# Patient Record
Sex: FEMALE | Race: AMERICAN INDIAN OR ALASKA NATIVE
[De-identification: names, ages, dates, MRNs, and addresses within clinical notes are randomized per-mention and may not be internally consistent; named-entity substitution may affect disease eponyms.]

---

## 2018-02-10 ENCOUNTER — HOSPITAL ENCOUNTER (EMERGENCY)
Dept: HOSPITAL 5 - ED | Age: 23
Discharge: HOME | End: 2018-02-10
Payer: MEDICAID

## 2018-02-10 VITALS — DIASTOLIC BLOOD PRESSURE: 55 MMHG | SYSTOLIC BLOOD PRESSURE: 111 MMHG

## 2018-02-10 DIAGNOSIS — O26.891: Primary | ICD-10-CM

## 2018-02-10 DIAGNOSIS — Z3A.15: ICD-10-CM

## 2018-02-10 DIAGNOSIS — R10.33: ICD-10-CM

## 2018-02-10 LAB
ALBUMIN SERPL-MCNC: 4 G/DL (ref 3.9–5)
ALT SERPL-CCNC: 8 UNITS/L (ref 7–56)
BASOPHILS # (AUTO): 0 K/MM3 (ref 0–0.1)
BASOPHILS NFR BLD AUTO: 0.6 % (ref 0–1.8)
BUN SERPL-MCNC: 7 MG/DL (ref 7–17)
BUN/CREAT SERPL: 18 %
CALCIUM SERPL-MCNC: 9 MG/DL (ref 8.4–10.2)
EOSINOPHIL # BLD AUTO: 0 K/MM3 (ref 0–0.4)
EOSINOPHIL NFR BLD AUTO: 0.6 % (ref 0–4.3)
HCT VFR BLD CALC: 35.9 % (ref 30.3–42.9)
HEMOLYSIS INDEX: 3
HGB BLD-MCNC: 12.2 GM/DL (ref 10.1–14.3)
LYMPHOCYTES # BLD AUTO: 1 K/MM3 (ref 1.2–5.4)
LYMPHOCYTES NFR BLD AUTO: 13.5 % (ref 13.4–35)
MCH RBC QN AUTO: 31 PG (ref 28–32)
MCHC RBC AUTO-ENTMCNC: 34 % (ref 30–34)
MCV RBC AUTO: 89 FL (ref 79–97)
MONOCYTES # (AUTO): 0.4 K/MM3 (ref 0–0.8)
MONOCYTES % (AUTO): 5.7 % (ref 0–7.3)
PLATELET # BLD: 155 K/MM3 (ref 140–440)
RBC # BLD AUTO: 4.02 M/MM3 (ref 3.65–5.03)

## 2018-02-10 PROCEDURE — 86901 BLOOD TYPING SEROLOGIC RH(D): CPT

## 2018-02-10 PROCEDURE — 84702 CHORIONIC GONADOTROPIN TEST: CPT

## 2018-02-10 PROCEDURE — 80053 COMPREHEN METABOLIC PANEL: CPT

## 2018-02-10 PROCEDURE — 85025 COMPLETE CBC W/AUTO DIFF WBC: CPT

## 2018-02-10 PROCEDURE — 76805 OB US >/= 14 WKS SNGL FETUS: CPT

## 2018-02-10 PROCEDURE — 86850 RBC ANTIBODY SCREEN: CPT

## 2018-02-10 PROCEDURE — 84703 CHORIONIC GONADOTROPIN ASSAY: CPT

## 2018-02-10 PROCEDURE — 36415 COLL VENOUS BLD VENIPUNCTURE: CPT

## 2018-02-10 PROCEDURE — 86900 BLOOD TYPING SEROLOGIC ABO: CPT

## 2018-02-10 NOTE — EMERGENCY DEPARTMENT REPORT
Blank Doc





- Documentation


Documentation: 





Patient is a 22-year-old female  at 15 weeks and presents with abdominal 

pain she denies any vaginal bleeding or vaginal discharge will urinalysis blood 

work and OB ultrasound.

## 2018-02-10 NOTE — ULTRASOUND REPORT
FINAL REPORT



EXAM:  US OB &gt; = 14 WEEKS FETUS



HISTORY:  abd pain 



TECHNIQUE:   Ultrasound evaluation of the gravid uterus 



PRIORS:  Pelvic ultrasound 5/7/2016



FINDINGS:  

There is a single viable intrauterine pregnancy with documented

cardiac activity.  Multiple ultrasound measurements are made to

determine a composite gestational age. 



Nonspecific slight abnormality of fetal ratio. Cephalic index

slightly increased at 86.4, upper normal 83.0. Other fetal ratios

are within normal limits. 



There is no evidence of placenta previa or abruption.  The

maternal cervix is closed.  The quantity of visualized amniotic

fluid appears grossly normal.  No sonographic abnormality in the

visualized portion of the fetal anatomy.



Heart rate:  152 beats per minute



Fetal position:  Cephalic



Placental position:  Anterior grade 0 



Maternal cervix length:  3.5cm



Ultrasound estimated gestational age: 15 weeks 4 days



Ultrasound estimated delivery date: 7/31/2018



LMP estimated gestational age: 15 weeks 1 day



LMP estimated delivery date: 8/3/2018



22 mm simple appearing cyst in left ovary may be a corpus luteum.





IMPRESSION:  

Single viable intrauterine pregnancy with the above parameters



Nonspecific slightly increased cephalic index

## 2018-02-10 NOTE — EMERGENCY DEPARTMENT REPORT
ED Abdominal Pain HPI





- General


Chief Complaint: Abdominal Pain


Stated Complaint: ABDOMINAL ABSCESS


Time Seen by Provider: 02/10/18 16:04


Source: patient


Mode of arrival: Ambulatory


Limitations: No Limitations





- History of Present Illness


-: Gradual


Location: periumbilical





- Related Data


 Previous Rx's











 Medication  Instructions  Recorded  Last Taken  Type


 


Doxycycline [Vibramycin] 100 mg PO Q12HR #28 capsule 05/07/16 Unknown Rx


 


Ketorolac [Toradol] 10 mg PO Q6H PRN #20 tablet 05/07/16 Unknown Rx


 


Ondansetron [Zofran Odt] 4 mg PO QID PRN #20 tab.rapdis 05/07/16 Unknown Rx


 


metroNIDAZOLE [Flagyl] 500 mg PO Q12HR #14 tab 05/07/16 Unknown Rx











 Allergies











Allergy/AdvReac Type Severity Reaction Status Date / Time


 


No Known Allergies Allergy   Verified 02/10/18 12:52














ED Review of Systems


ROS: 


Stated complaint: ABDOMINAL ABSCESS


Other details as noted in HPI





Comment: All other systems reviewed and negative


Skin: other (PERIUMBIILICAL PAIN)





ED Past Medical Hx





- Past Medical History


Previous Medical History?: No





- Surgical History


Past Surgical History?: No





- Social History


Smoking Status: Never Smoker


Substance Use Type: None





- Medications


Home Medications: 


 Home Medications











 Medication  Instructions  Recorded  Confirmed  Last Taken  Type


 


Doxycycline [Vibramycin] 100 mg PO Q12HR #28 capsule 05/07/16  Unknown Rx


 


Ketorolac [Toradol] 10 mg PO Q6H PRN #20 tablet 05/07/16  Unknown Rx


 


Ondansetron [Zofran Odt] 4 mg PO QID PRN #20 tab.rapdis 05/07/16  Unknown Rx


 


metroNIDAZOLE [Flagyl] 500 mg PO Q12HR #14 tab 05/07/16  Unknown Rx














ED Physical Exam





- General


Limitations: No Limitations


General appearance: alert





- Head


Head exam: Present: atraumatic





- Eye


Eye exam: Present: normal appearance





- ENT


ENT exam: Present: normal exam, mucous membranes moist





- Neck


Neck exam: Present: normal inspection





- Respiratory


Respiratory exam: Present: normal lung sounds bilaterally





- Cardiovascular


Cardiovascular Exam: Present: regular rate





- GI/Abdominal


GI/Abdominal exam: Present: soft, normal bowel sounds, other (GRAVID).  Absent: 

distended, tenderness, guarding, rebound, rigid, diminished bowel sounds, 

hyperactive bowel sounds, hypoactive bowel sounds, organomegaly, mass, bruit, 

pulsatile mass, hernia





- Extremities Exam


Extremities exam: Present: normal inspection





- Back Exam


Back exam: Present: normal inspection





- Neurological Exam


Neurological exam: Present: alert, oriented X3, CN II-XII intact





- Psychiatric


Psychiatric exam: Present: normal affect, normal mood





- Skin


Skin exam: Present: warm, dry, intact, normal color





ED Course


 Vital Signs











  02/10/18





  12:52


 


Pulse Rate 100 H


 


Respiratory 16





Rate 


 


Blood Pressure 124/59


 


O2 Sat by Pulse 99





Oximetry 














- Reevaluation(s)


Reevaluation #1: 





02/10/18 16:15


Patient presents to the ER today with a very dramatic presentation of 

bellybutton peristasis she states it is so bad that she cannot sleep at night.  

Patient is 15 weeks pregnant.  No vaginal bleeding or discharge.  Patient has 

seen an OB/GYN.  She is on prenatal vitamins.





Lab work was noted beta-hCG noted.  UA noted.





Ultrasound noted fetal heart tones and activity present.





This is patient's first pregnancy pregnancy she is on small petite side 

baseline.


Discussion about expectations of PREG





Patient educated on pain management.  She will follow up with OB next week.





ED Medical Decision Making





- Lab Data


Result diagrams: 


 02/10/18 13:05





 02/10/18 13:05





- Radiology Data


Radiology results: report reviewed





- Medical Decision Making





SEE NOTE





- Differential Diagnosis


RO SPONT AB


Critical care attestation.: 


If time is entered above; I have spent that time in minutes in the direct care 

of this critically ill patient, excluding procedure time.








ED Disposition


Clinical Impression: 


 Pregnancy, Umbilical pain





Disposition: DC-01 TO HOME OR SELFCARE


Is pt being admited?: No


Does the pt Need Aspirin: No


Condition: Stable


Instructions:  Pregnancy (ED)


Additional Instructions: 


FOLLOW UP WITH OBGYN THIS WEEK





TAKE TYLENOL FOR PAIN





CONTINUE YOUR PRENATAL VITAMINS





MAKE SURE YOU TELL OBGYN YOU WERE HERE SO THAT THEY CAN GET YOUR RESULTS. 





Referrals: 


PRIMARY CARE,MD [Primary Care Provider] - 3-5 Days


Time of Disposition: 16:10

## 2018-08-13 ENCOUNTER — HOSPITAL ENCOUNTER (INPATIENT)
Dept: HOSPITAL 5 - LD | Age: 23
LOS: 5 days | Discharge: HOME | End: 2018-08-18
Attending: OBSTETRICS & GYNECOLOGY | Admitting: OBSTETRICS & GYNECOLOGY
Payer: COMMERCIAL

## 2018-08-13 DIAGNOSIS — Z79.899: ICD-10-CM

## 2018-08-13 DIAGNOSIS — O48.0: Primary | ICD-10-CM

## 2018-08-13 DIAGNOSIS — Z83.2: ICD-10-CM

## 2018-08-13 DIAGNOSIS — Z84.89: ICD-10-CM

## 2018-08-13 DIAGNOSIS — Z3A.41: ICD-10-CM

## 2018-08-13 DIAGNOSIS — F10.10: ICD-10-CM

## 2018-08-13 DIAGNOSIS — D64.9: ICD-10-CM

## 2018-08-13 DIAGNOSIS — B00.9: ICD-10-CM

## 2018-08-13 DIAGNOSIS — Z79.2: ICD-10-CM

## 2018-08-13 LAB
HCT VFR BLD CALC: 35.9 % (ref 30.3–42.9)
HGB BLD-MCNC: 12.2 GM/DL (ref 10.1–14.3)
MCH RBC QN AUTO: 31 PG (ref 28–32)
MCHC RBC AUTO-ENTMCNC: 34 % (ref 30–34)
MCV RBC AUTO: 91 FL (ref 79–97)
PLATELET # BLD: 150 K/MM3 (ref 140–440)
RBC # BLD AUTO: 3.97 M/MM3 (ref 3.65–5.03)

## 2018-08-13 PROCEDURE — 36415 COLL VENOUS BLD VENIPUNCTURE: CPT

## 2018-08-13 PROCEDURE — G0463 HOSPITAL OUTPT CLINIC VISIT: HCPCS

## 2018-08-13 PROCEDURE — 86850 RBC ANTIBODY SCREEN: CPT

## 2018-08-13 PROCEDURE — A6250 SKIN SEAL PROTECT MOISTURIZR: HCPCS

## 2018-08-13 PROCEDURE — 85014 HEMATOCRIT: CPT

## 2018-08-13 PROCEDURE — 85018 HEMOGLOBIN: CPT

## 2018-08-13 PROCEDURE — 99211 OFF/OP EST MAY X REQ PHY/QHP: CPT

## 2018-08-13 PROCEDURE — 86900 BLOOD TYPING SEROLOGIC ABO: CPT

## 2018-08-13 PROCEDURE — 85027 COMPLETE CBC AUTOMATED: CPT

## 2018-08-13 PROCEDURE — 3E0P7VZ INTRODUCTION OF HORMONE INTO FEMALE REPRODUCTIVE, VIA NATURAL OR ARTIFICIAL OPENING: ICD-10-PCS

## 2018-08-13 PROCEDURE — 86592 SYPHILIS TEST NON-TREP QUAL: CPT

## 2018-08-13 PROCEDURE — 59200 INSERT CERVICAL DILATOR: CPT

## 2018-08-13 PROCEDURE — 86901 BLOOD TYPING SEROLOGIC RH(D): CPT

## 2018-08-13 NOTE — HISTORY AND PHYSICAL REPORT
History of Present Illness


Date of examination: 18


Date of admission: 


18 20:29





Chief complaint: 





IOL for post dates 


History of present illness: 


This is a a22 yo  at 41 + weeks admitted to labor and delivery for IOL. 

She is a patient of [facility]. Her problem list include UTi treated with abx and 

horace neg. 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 

on perineum. She has anemia on iron once a day. 








Past History


Past Medical History: no pertinent history


Past Surgical History: no surgical history


GYN History: herpes


Family/Genetic History: sickle cell/trait (anemia ), other (pulmonary embolism )


Social history: smoking, alcohol abuse.  denies: prescription drug abuse





- Obstetrical History


Expected Date of Delivery: 18


Actual Gestation: 41 Week(s) 3 Day(s) 


: 2


Para: 0


Hx # Term Pregnancies: 0


Number of  Pregnancies: 0


Spontaneous Abortions: 0


Induced : 1


Number of Living Children: 0





Medications and Allergies


 Allergies











Allergy/AdvReac Type Severity Reaction Status Date / Time


 


No Known Allergies Allergy   Verified 02/10/18 12:52











 Home Medications











 Medication  Instructions  Recorded  Confirmed  Last Taken  Type


 


Doxycycline [Vibramycin] 100 mg PO Q12HR #28 capsule 16  Unknown Rx


 


Ketorolac [Toradol] 10 mg PO Q6H PRN #20 tablet 16  Unknown Rx


 


Ondansetron [Zofran Odt] 4 mg PO QID PRN #20 tab.rapdis 16  Unknown Rx


 


metroNIDAZOLE [Flagyl] 500 mg PO Q12HR #14 tab 16  Unknown Rx











Active Meds: 


Active Medications





Butorphanol Tartrate (Stadol)  2 mg IV Q2H PRN


   PRN Reason: Pain , Severe (7-10)


Fentanyl (Sublimaze)  100 mcg IV Q2H PRN


   PRN Reason: Labor Pain


Lactated Ringer's (Lactated Ringers)  1,000 mls @ 125 mls/hr IV AS DIRECT DM











Review of Systems


All systems: negative





- Vital Signs


Vital signs: 


 Vital Signs











Pulse Pulse Ox


 


 79   99 


 


 18 21:00  18 21:00








 











Temp Pulse Resp BP Pulse Ox


 


 98.1 F   64   18   135/65   99 


 


 18 21:14  18 22:46  18 21:14  18 21:22  18 22:46














- Physical Exam


Breasts: Positive: normal


Cardiovascular: Regular rate, Normal S1


Lungs: Positive: Clear to auscultation, Normal air movement


Abdomen: Positive: normal appearance, soft, normal bowel sounds.  Negative: 

distention, tenderness, guarding


Genitourinary (Female): Positive: normal external genitalia, normal perenium


Vulva: both: normal


Vagina: Positive: normal moisture


Uterus: Positive: normal size


Anus/Rectum: Positive: normal perianal skin


Extremities: Positive: normal


Deep Tendon Reflex Grade: Normal +2





- Obstetrical


FHR: category 1


Uterine Contraction Monitor Mode: Palpation


Cervical Dilatation: 0


Cervical Effacement Percentage: 40


Fetal station: -3


Uterine Tone Measurement Phase: Resting





Results


Result Diagrams: 


 18 21:28





All other labs normal.








Assessment and Plan


A/P HD#1 IOL for postdates 


       GBS neg


       induction with cervidil 


       offer epidural 


       close monitor of maternal and fetus status 


       epect vaginal delivery

## 2018-08-14 RX ADMIN — SODIUM CHLORIDE, SODIUM LACTATE, POTASSIUM CHLORIDE, AND CALCIUM CHLORIDE SCH MLS/HR: .6; .31; .03; .02 INJECTION, SOLUTION INTRAVENOUS at 02:49

## 2018-08-14 RX ADMIN — SODIUM CHLORIDE, SODIUM LACTATE, POTASSIUM CHLORIDE, AND CALCIUM CHLORIDE SCH MLS/HR: .6; .31; .03; .02 INJECTION, SOLUTION INTRAVENOUS at 00:36

## 2018-08-14 RX ADMIN — MISOPROSTOL PRN MCG: 100 TABLET ORAL at 15:33

## 2018-08-14 RX ADMIN — MISOPROSTOL PRN MCG: 100 TABLET ORAL at 21:17

## 2018-08-14 NOTE — EVENT NOTE
Date: 08/14/18





Cervidil placed at 2339


reactive and Category 1 tracing


irreg contractions


P: D/C Cervidil as ordered


Evaluate for cervical change and proceed with induction for postdates

## 2018-08-15 RX ADMIN — OXYCODONE AND ACETAMINOPHEN PRN TAB: 5; 325 TABLET ORAL at 21:30

## 2018-08-15 RX ADMIN — SODIUM CHLORIDE, SODIUM LACTATE, POTASSIUM CHLORIDE, AND CALCIUM CHLORIDE SCH MLS/HR: .6; .31; .03; .02 INJECTION, SOLUTION INTRAVENOUS at 10:47

## 2018-08-15 RX ADMIN — SODIUM CHLORIDE, SODIUM LACTATE, POTASSIUM CHLORIDE, AND CALCIUM CHLORIDE SCH MLS/HR: .6; .31; .03; .02 INJECTION, SOLUTION INTRAVENOUS at 13:00

## 2018-08-15 RX ADMIN — CEFAZOLIN SCH MLS/HR: 330 INJECTION, POWDER, FOR SOLUTION INTRAMUSCULAR; INTRAVENOUS at 21:35

## 2018-08-15 RX ADMIN — BUTORPHANOL TARTRATE PRN MG: 2 INJECTION, SOLUTION INTRAMUSCULAR; INTRAVENOUS at 07:41

## 2018-08-15 RX ADMIN — DEXTROSE, SODIUM CHLORIDE, SODIUM LACTATE, POTASSIUM CHLORIDE, AND CALCIUM CHLORIDE SCH MLS/HR: 5; .6; .31; .03; .02 INJECTION, SOLUTION INTRAVENOUS at 21:39

## 2018-08-15 RX ADMIN — KETOROLAC TROMETHAMINE PRN MG: 30 INJECTION, SOLUTION INTRAMUSCULAR at 23:34

## 2018-08-15 RX ADMIN — BUTORPHANOL TARTRATE PRN MG: 2 INJECTION, SOLUTION INTRAMUSCULAR; INTRAVENOUS at 03:32

## 2018-08-15 RX ADMIN — SODIUM CHLORIDE, SODIUM LACTATE, POTASSIUM CHLORIDE, AND CALCIUM CHLORIDE SCH MLS/HR: .6; .31; .03; .02 INJECTION, SOLUTION INTRAVENOUS at 03:36

## 2018-08-15 NOTE — PROCEDURE NOTE
OB Delivery Note





- Delivery


Date of Delivery: 08/15/18


Surgeon: LAUREN LAN


Estimated blood loss: other (600 mL)





-  Section


Preop diagnosis: nonreassuring FHR tracing


Postop diagnosis: same


 section procedure:  section, primary low transverse


Disposition: PACU


Complications: none


Narrative: 





Please see operative note. 





- Infant


  ** A


Apgar at 1 minute: 8


Apgar at 5 minutes: 9


Infant Gender: Male (2689g (5lb 15 oz) @ 1438 pm)

## 2018-08-15 NOTE — PROGRESS NOTE
Assessment and Plan





A: IUP at 41w5d


    Day 2 of induction


    SROM this AM


    GBS Negative





P: Routine intrapartum care


    Epidural


    Continue pitocin augmentation 


  





Subjective





- Subjective


Date of service: 08/15/18


Principal diagnosis: IUP at 41 wks undergoing induction of labor 


Interval history: 





Pt experienced SROM this morning. Very uncomfortable with contractions. 


Patient reports: other (Per HPI )





Objective





- Vital Signs


Vital Signs: 


 Vital Signs - 12hr











  08/14/18 08/14/18 08/14/18





  23:50 23:54 23:55


 


Temperature   


 


Pulse Rate 94 H 60 56 L


 


Blood Pressure   


 


O2 Sat by Pulse 98 93 98





Oximetry   














  08/15/18 08/15/18 08/15/18





  00:00 00:05 00:10


 


Temperature   


 


Pulse Rate 60 57 L 59 L


 


Blood Pressure   


 


O2 Sat by Pulse 97 95 97





Oximetry   














  08/15/18 08/15/18 08/15/18





  00:15 03:26 03:37


 


Temperature   


 


Pulse Rate 68 55 L 75


 


Blood Pressure  145/73 


 


O2 Sat by Pulse 99  97





Oximetry   














  08/15/18 08/15/18 08/15/18





  03:42 03:47 03:52


 


Temperature   


 


Pulse Rate 68 67 60


 


Blood Pressure   


 


O2 Sat by Pulse 98 97 98





Oximetry   














  08/15/18 08/15/18 08/15/18





  03:57 04:02 04:07


 


Temperature   


 


Pulse Rate 73 65 64


 


Blood Pressure   


 


O2 Sat by Pulse 98 97 97





Oximetry   














  08/15/18 08/15/18 08/15/18





  04:12 04:25 04:26


 


Temperature   


 


Pulse Rate 78 67 64


 


Blood Pressure  140/80 


 


O2 Sat by Pulse 97  97





Oximetry   














  08/15/18 08/15/18 08/15/18





  04:31 04:36 04:41


 


Temperature   


 


Pulse Rate 65 67 58 L


 


Blood Pressure   


 


O2 Sat by Pulse 97 98 97





Oximetry   














  08/15/18 08/15/18 08/15/18





  04:46 04:51 06:25


 


Temperature   


 


Pulse Rate 63 62 53 L


 


Blood Pressure   149/76


 


O2 Sat by Pulse 99 97 





Oximetry   














  08/15/18 08/15/18 08/15/18





  07:00 07:25 07:51


 


Temperature 97.5 F L  


 


Pulse Rate  54 L 59 L


 


Blood Pressure  150/81 


 


O2 Sat by Pulse   96





Oximetry   














  08/15/18 08/15/18 08/15/18





  07:56 08:01 08:06


 


Temperature   


 


Pulse Rate 62 60 58 L


 


Blood Pressure   


 


O2 Sat by Pulse 97 96 96





Oximetry   














  08/15/18 08/15/18 08/15/18





  08:11 08:16 08:21


 


Temperature   


 


Pulse Rate 63 57 L 59 L


 


Blood Pressure   


 


O2 Sat by Pulse 96 96 96





Oximetry   














  08/15/18 08/15/18 08/15/18





  08:24 08:26 10:08


 


Temperature   


 


Pulse Rate 62 57 L 58 L


 


Blood Pressure  152/73 135/87


 


O2 Sat by Pulse 93 96 





Oximetry   














  08/15/18 08/15/18





  10:25 11:25


 


Temperature  


 


Pulse Rate 53 L 54 L


 


Blood Pressure 161/80 170/84


 


O2 Sat by Pulse  





Oximetry  














- Exam


Breasts: deferred


Cardiovascular: Regular rate


Lungs: Clear to auscultation


Abdomen: Present: soft (gravid )


Uterus: Present: normal (gravid )


FHR: auscultation normal


Uterine Contraction Monitor Mode: External


Cervical Dilatation: 1


Cervical Effacement Percentage: 80 (per RN )


Fetal station: -1


Uterine Contraction Pattern: Regular


Uterine Tone Measurement Phase: Resting


Uterine Contraction Intensity: Strong/Firm





- Labs


Labs: 


 Laboratory Results - last 24 hr











  08/13/18





  21:28


 


RPR  Nonreactive

## 2018-08-15 NOTE — ANESTHESIA CONSULTATION
Anesthesia Consult and Med Hx





- Airway


Anesthetic Teeth Evaluation: Good


ROM Head & Neck: Adequate


Mental/Hyoid Distance: Adequate


Mallampati Class: Class II


Intubation Access Assessment: Probably Good





- Pulmonary Exam


CTA: Yes





- Cardiac Exam


Cardiac Exam: RRR





- Pre-Operative Health Status


ASA Pre-Surgery Classification: ASA2, Emergency


Proposed Anesthetic Plan: Epidural





- Pulmonary


Hx Smoking: No


Hx Asthma: No


COPD: No


Hx Pneumonia: No





- Cardiovascular System


Hx Hypertension: No





- Central Nervous System


Hx Seizures: No


Hx Psychiatric Problems: No (anxiety)





- Endocrine


Hx Renal Disease: No


Hx End Stage Renal Disease: No


Hx Hypothyroidism: No


Hx Hyperthyroidism: No





- Hematic


Hx Anemia: No


Hx Sickle Cell Disease: No





- Other Systems


Hx Alcohol Use: No

## 2018-08-15 NOTE — PROGRESS NOTE
Assessment and Plan





A: Term


Late decels


Remote from delivery


P: Primary C/S








Subjective





- Subjective


Date of service: 08/15/18


Principal diagnosis: IUP at 41 wks undergoing induction of labor 


Patient reports: loss of fluid, fetal movement normal, contractions, other (Per 

HPI ), no new complaints, no vaginal bleeding





Objective





- Vital Signs


Vital Signs: 


 Vital Signs - 12hr











  08/15/18 08/15/18 08/15/18





  03:26 03:37 03:42


 


Temperature   


 


Pulse Rate 55 L 75 68


 


Blood Pressure 145/73  


 


O2 Sat by Pulse  97 98





Oximetry   














  08/15/18 08/15/18 08/15/18





  03:47 03:52 03:57


 


Temperature   


 


Pulse Rate 67 60 73


 


Blood Pressure   


 


O2 Sat by Pulse 97 98 98





Oximetry   














  08/15/18 08/15/18 08/15/18





  04:02 04:07 04:12


 


Temperature   


 


Pulse Rate 65 64 78


 


Blood Pressure   


 


O2 Sat by Pulse 97 97 97





Oximetry   














  08/15/18 08/15/18 08/15/18





  04:25 04:26 04:31


 


Temperature   


 


Pulse Rate 67 64 65


 


Blood Pressure 140/80  


 


O2 Sat by Pulse  97 97





Oximetry   














  08/15/18 08/15/18 08/15/18





  04:36 04:41 04:46


 


Temperature   


 


Pulse Rate 67 58 L 63


 


Blood Pressure   


 


O2 Sat by Pulse 98 97 99





Oximetry   














  08/15/18 08/15/18 08/15/18





  04:51 06:25 07:00


 


Temperature   97.5 F L


 


Pulse Rate 62 53 L 


 


Blood Pressure  149/76 


 


O2 Sat by Pulse 97  





Oximetry   














  08/15/18 08/15/18 08/15/18





  07:25 07:51 07:56


 


Temperature   


 


Pulse Rate 54 L 59 L 62


 


Blood Pressure 150/81  


 


O2 Sat by Pulse  96 97





Oximetry   














  08/15/18 08/15/18 08/15/18





  08:01 08:06 08:11


 


Temperature   


 


Pulse Rate 60 58 L 63


 


Blood Pressure   


 


O2 Sat by Pulse 96 96 96





Oximetry   














  08/15/18 08/15/18 08/15/18





  08:16 08:21 08:24


 


Temperature   


 


Pulse Rate 57 L 59 L 62


 


Blood Pressure   


 


O2 Sat by Pulse 96 96 93





Oximetry   














  08/15/18 08/15/18 08/15/18





  08:26 10:08 10:25


 


Temperature   


 


Pulse Rate 57 L 58 L 53 L


 


Blood Pressure 152/73 135/87 161/80


 


O2 Sat by Pulse 96  





Oximetry   














  08/15/18 08/15/18 08/15/18





  11:25 12:25 12:33


 


Temperature   


 


Pulse Rate 54 L 68 71


 


Blood Pressure 170/84 144/65 


 


O2 Sat by Pulse   98





Oximetry   














  08/15/18 08/15/18 08/15/18





  12:38 12:42 12:43


 


Temperature   


 


Pulse Rate 63 65 67


 


Blood Pressure  126/63 


 


O2 Sat by Pulse 99  98





Oximetry   














  08/15/18 08/15/18 08/15/18





  12:45 12:48 12:51


 


Temperature   


 


Pulse Rate 71 70 80


 


Blood Pressure 129/61 136/68 130/77


 


O2 Sat by Pulse  98 





Oximetry   














  08/15/18 08/15/18 08/15/18





  12:53 12:54 12:57


 


Temperature   


 


Pulse Rate 62 61 71


 


Blood Pressure  132/68 124/61


 


O2 Sat by Pulse 100  





Oximetry   














  08/15/18 08/15/18 08/15/18





  12:58 13:00 13:03


 


Temperature   


 


Pulse Rate 70 67 74


 


Blood Pressure  122/59 123/63


 


O2 Sat by Pulse 99  99





Oximetry   














  08/15/18 08/15/18 08/15/18





  13:06 13:08 13:09


 


Temperature   


 


Pulse Rate 67 65 67


 


Blood Pressure 121/61  131/62


 


O2 Sat by Pulse  99 





Oximetry   














  08/15/18 08/15/18 08/15/18





  13:12 13:13 13:15


 


Temperature   


 


Pulse Rate 64 68 71


 


Blood Pressure 126/62  132/69


 


O2 Sat by Pulse  97 





Oximetry   














  08/15/18 08/15/18 08/15/18





  13:18 13:21 13:23


 


Temperature   


 


Pulse Rate 67 76 59 L


 


Blood Pressure 126/66 127/65 


 


O2 Sat by Pulse 99  99





Oximetry   














  08/15/18 08/15/18 08/15/18





  13:24 13:27 13:28


 


Temperature   


 


Pulse Rate 61 56 L 61


 


Blood Pressure 123/65 129/67 


 


O2 Sat by Pulse   98





Oximetry   














  08/15/18 08/15/18 08/15/18





  13:30 13:33 13:36


 


Temperature   


 


Pulse Rate 61 64 67


 


Blood Pressure 132/74 129/78 130/80


 


O2 Sat by Pulse  99 





Oximetry   














  08/15/18 08/15/18





  13:38 13:39


 


Temperature  


 


Pulse Rate 59 L 56 L


 


Blood Pressure  135/69


 


O2 Sat by Pulse 99 





Oximetry  














- Exam


Breasts: deferred


FHR: category 2


FHR comments: 





rep late decels with minimal variability 


Uterine Contraction Monitor Mode: External


Cervical Dilatation: 3


Cervical Effacement Percentage: 70


Fetal station: -2





- Labs


Labs: 


 Laboratory Results - last 24 hr











  08/13/18





  21:28


 


RPR  Nonreactive

## 2018-08-15 NOTE — OPERATIVE REPORT
Operative Report


Operative Report: 


Date of procedure:August 15, 2018 


Preoperative diagnosis: 1)  IUP at 41w5d 2) Fetal Intolerance to Labor 3) NRFHTs

- repetitive lates 


Postoperative diagnosis: Same 


Procedure: Primary low transverse  section 


Surgeon: Keke Chandra M.D.


Anesthesia: Epidural


Findings:


1) Viable male , Apgars 8 and 9, weight 2689g, (5 lb 15 oz) in vertex 

presentation 


2) Normal-appearing uterus ovaries and tubes


Estimated blood loss: 600 mL 


IV fluids: 1000 mL 


Urine output: 200 mL, clear at the end of the procedure 


Drains: Byrd to gravity


Specimens: None


Complications: None. Counts correct x 3


Disposition: Stable to PACU





Indication for procedure:


Pt is a 22 year old  at 41w5d who was undergoing induction of labor and 

began to have repetitive late decelerations remote from delivery. The decision 

was made to proceed with  section. 





Operation in detail: After the risks, benefits, alternatives and complications 

were explained to the patient she gave informed consent for the procedure.  She 

was subsequently taken to the operating room where epidural anesthesia was 

noted to be adequate.  She was subsequently placed in the dorsal supine 

position with leftward tilt and prepped and draped in a normal sterile fashion.

  Fetal heart tones were noted to be in the 145s prior to incision.  A timeout 

was performed.





A Pfannenstiel skin incision was made with the knife and carried down to the 

layer of the fascia with the Bovie.  The fascia was incised in the midline and 

the fascial incision was extended bilaterally with the Bovie.  Attention was 

then turned to the superior aspect of the incision which was grasped with two 

Kochers, tented up, and dissected off the rectus muscles.  Attention was then 

turned to the inferior aspect of the incision which was grasped with two Kochers

, tented up and dissected off the rectus muscles.  The rectus muscles were then 

 in the midline.  The peritoneum was then entered bluntly. The 

peritoneal incision was extended with good visualization of the bladder.  The 

peritoneal incision was then stretched. The bladder blade was placed.





The vesicouterine peritoneum was grasped with smooth pickups and incised with 

Metzenbaum scissors.  Metzenbaum scissors were used to extend the incision 

bilaterally.  The bladder flap was then created digitally and the bladder blade 

was replaced.  A transverse incision was made in the lower uterine segment with 

a knife and extended bilaterally with the bandage scissors.  The fetal head was 

delivered without difficulty followed by shoulders and body.   was bulb 

suctioned at delivery.  The cord was clamped and cut and the  was handed 

to NICU staff in attendance.  Cord blood was collected.  The placenta was then 

delivered manually.  





The uterus was then cleared of all clots and debris.  The hysterotomy was then 

reapproximated with 0 Vicryl in a running locked fashion.  A second layer of 

the same suture was used in imbricating fashion.  The hysterotomy was inspected 

and hemostasis was noted.  The gutters were irrigated and cleared of all clots 

and debris.  The hysterotomy was again inspected and noted to be hemostatic. 

Surgicel was placed over the hysterotomy.  The peritoneum was reapproximated 

with 2-0 Vicryl in a running fashion incorporating the rectus muscles.  The 

fascia was reapproximated with 0 Vicryl in a running fashion. The subcutaneous 

tissue was reapproximated with 3-0 Vicryl in a running fashion. The skin was 

reapproximated with 4-0 Vicryl in a subcuticular fashion.  The incision was 

then covered with steri strips and a pressure dressing. The procedure was then 

ended.  The patient tolerated the procedure well and was taken to the PACU in 

stable condition.  All instrument, lap, and needle counts were correct 3.

## 2018-08-16 LAB
HCT VFR BLD CALC: 34.4 % (ref 30.3–42.9)
HGB BLD-MCNC: 11.9 GM/DL (ref 10.1–14.3)

## 2018-08-16 RX ADMIN — KETOROLAC TROMETHAMINE PRN MG: 30 INJECTION, SOLUTION INTRAMUSCULAR at 06:10

## 2018-08-16 RX ADMIN — OXYCODONE AND ACETAMINOPHEN PRN TAB: 5; 325 TABLET ORAL at 18:55

## 2018-08-16 RX ADMIN — DEXTROSE, SODIUM CHLORIDE, SODIUM LACTATE, POTASSIUM CHLORIDE, AND CALCIUM CHLORIDE SCH MLS/HR: 5; .6; .31; .03; .02 INJECTION, SOLUTION INTRAVENOUS at 06:14

## 2018-08-16 RX ADMIN — CEFAZOLIN SCH MLS/HR: 330 INJECTION, POWDER, FOR SOLUTION INTRAMUSCULAR; INTRAVENOUS at 06:11

## 2018-08-16 RX ADMIN — OXYCODONE AND ACETAMINOPHEN PRN TAB: 5; 325 TABLET ORAL at 08:41

## 2018-08-16 RX ADMIN — OXYCODONE AND ACETAMINOPHEN PRN TAB: 5; 325 TABLET ORAL at 13:40

## 2018-08-16 RX ADMIN — OXYCODONE AND ACETAMINOPHEN PRN TAB: 5; 325 TABLET ORAL at 04:08

## 2018-08-16 RX ADMIN — FERROUS SULFATE TAB 325 MG (65 MG ELEMENTAL FE) SCH MG: 325 (65 FE) TAB at 08:42

## 2018-08-16 NOTE — PROGRESS NOTE
Assessment and Plan





O: VSS AF


PP H/H: 11.9/34.4


A: Stable POD #1


P: Routine orders





Subjective





- Subjective


Date of service: 18


Principal diagnosis: IUP at 41 wks undergoing induction of labor 


Patient reports: appetite normal (Taking regular diet this am), voiding normally

, pain well controlled, flatus, ambulating normally


: doing well, bottle feeding





Objective





- Vital Signs


Latest vital signs: 


 Vital Signs











  Temp Pulse Resp BP BP Pulse Ox


 


 18 07:48  98.2 F  89  18  107/60   96


 


 18 06:10    18   


 


 18 04:08    20   


 


 18 04:05  98.0 F  68  20   128/53 


 


 18 00:00  98 F  77  20   138/54 


 


 08/15/18 23:34    18   


 


 08/15/18 21:30    20   


 


 08/15/18 20:05  98.0 F  86  20   134/75 


 


 08/15/18 17:25  98.6 F  70  20   123/65 


 


 08/15/18 16:41   74  18  139/69   99


 


 08/15/18 16:26   72  14  138/77   98


 


 08/15/18 16:11   66  14  137/75   99


 


 08/15/18 15:56   73  14  147/80   99


 


 08/15/18 15:51   89  22  134/77   98


 


 08/15/18 15:46  99.2 F  72  16  144/55   99


 


 08/15/18 15:41  99.2 F  74  14  143/71   99


 


 08/15/18 14:19   77   131/77   96


 


 08/15/18 14:06   64   147/95  


 


 08/15/18 14:03   57 L   142/86  


 


 08/15/18 14:00   57 L   146/83  


 


 08/15/18 13:57   62   143/81  


 


 08/15/18 13:54   59 L   143/78  


 


 08/15/18 13:53   59 L     100


 


 08/15/18 13:51   56 L   148/77  


 


 08/15/18 13:48   59 L   126/71   100


 


 08/15/18 13:45   59 L   137/75  


 


 08/15/18 13:43   56 L     100


 


 08/15/18 13:42   55 L   137/74  


 


 08/15/18 13:39   56 L   135/69  


 


 08/15/18 13:38   59 L     99


 


 08/15/18 13:36   67   130/80  


 


 08/15/18 13:33   64   129/78   99


 


 08/15/18 13:30   61   132/74  


 


 08/15/18 13:28   61     98


 


 08/15/18 13:27   56 L   129/67  


 


 08/15/18 13:24   61   123/65  


 


 08/15/18 13:23   59 L     99


 


 08/15/18 13:21   76   127/65  


 


 08/15/18 13:18   67   126/66   99


 


 08/15/18 13:15   71   132/69  


 


 08/15/18 13:13   68     97


 


 08/15/18 13:12   64   126/62  


 


 08/15/18 13:09   67   131/62  


 


 08/15/18 13:08   65     99


 


 08/15/18 13:06   67   121/61  


 


 08/15/18 13:03   74   123/63   99


 


 08/15/18 13:00   67   122/59  


 


 08/15/18 12:58   70     99


 


 08/15/18 12:57   71   124/61  


 


 08/15/18 12:54   61   132/68  


 


 08/15/18 12:53   62     100


 


 08/15/18 12:51   80   130/77  


 


 08/15/18 12:48   70   136/68   98


 


 08/15/18 12:45   71   129/61  


 


 08/15/18 12:43   67     98


 


 08/15/18 12:42   65   126/63  


 


 08/15/18 12:38   63     99


 


 08/15/18 12:33   71     98


 


 08/15/18 12:25   68   144/65  


 


 08/15/18 11:25   54 L   170/84  


 


 08/15/18 10:25   53 L   161/80  


 


 08/15/18 10:08   58 L   135/87  








 Intake and Output











 08/15/18 08/16/18 08/16/18





 22:59 06:59 14:59


 


Intake Total 320 1120 


 


Output Total 600 600 


 


Balance -280 520 


 


Intake:   


 


   1000 


 


    ANCEF/NS 1 GM/50 ML 1 gm 50  





    In 50 ml @ 100 mls/hr IV   





    Q8H DM Rx#:[id number]   


 


    D5lr 1,000 ml @ 125 mls/  1000 





    hr IV AS DIRECT DM Rx#:   





    [id number]   


 


  Oral 120 120 


 


Output:   


 


  Urine 600 600 


 


    Indwelling Catheter 500 600 


 


Other:   


 


  Total, Intake Amount 120 120 


 


  Total, Output Amount 500 600 


 


  Estimated Blood Loss 600  














- Exam


Breasts: Present: deferred


Abdomen: Present: normal appearance, soft, tenderness.  Absent: distention


Uterus: Present: normal, firm, fundal height below umbilicus.  Absent: bogginess

, tenderness


Extremities: Present: normal.  Absent: edema


Incision: Present: normal, dry, intact, dressed

## 2018-08-17 RX ADMIN — FERROUS SULFATE TAB 325 MG (65 MG ELEMENTAL FE) SCH MG: 325 (65 FE) TAB at 12:04

## 2018-08-17 RX ADMIN — OXYCODONE AND ACETAMINOPHEN PRN TAB: 5; 325 TABLET ORAL at 23:57

## 2018-08-17 RX ADMIN — IBUPROFEN PRN MG: 800 TABLET, FILM COATED ORAL at 23:55

## 2018-08-17 RX ADMIN — OXYCODONE AND ACETAMINOPHEN PRN TAB: 5; 325 TABLET ORAL at 02:34

## 2018-08-17 RX ADMIN — OXYCODONE AND ACETAMINOPHEN PRN TAB: 5; 325 TABLET ORAL at 08:27

## 2018-08-17 NOTE — PROGRESS NOTE
Assessment and Plan


A/P POD#2 s/p primary c/sec for distress


       VSS  


       continue routine care


       valtrex for hsv2 outbreak 


       MOM for flatus 


        





Subjective





- Subjective


Date of service: 18


Principal diagnosis: IUP at 41 wks undergoing induction of labor 


Interval history: 


This is a a22 yo  at 41 + weeks admitted to labor and delivery for IOL. 

She is a patient of [facility]. Her problem list include UTi treated with abx and 

horace neg. 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 

on perineum. She has anemia on iron once a day. 





Patient reports: appetite normal, voiding normally, pain well controlled, flatus

, ambulating normally


[place]: doing well





Objective





- Vital Signs


Latest vital signs: 


 Vital Signs











  Temp Pulse Resp BP BP Pulse Ox


 


 18 00:00  98.0 F  70  20   123/66 


 


 18 15:44  97.9 F  86  18  107/57   98


 


 18 11:35  98.6 F  93 H  18  100/54   96








 Intake and Output











 18





 23:59 07:59 15:59


 


Intake Total 780 240 


 


Output Total 500  


 


Balance 280 240 


 


Intake:   


 


  Oral 780 240 


 


Output:   


 


  Urine 500  


 


    Indwelling Catheter 500  


 


Other:   


 


  Total, Intake Amount 240 240 


 


  Total, Output Amount 500  


 


  # Voids   


 


    Indwelling Catheter  3 














- Exam


Breasts: Present: normal


Cardiovascular: Present: Regular rate, Normal S1


Lungs: Present: Clear to auscultation, Normal air movement


Abdomen: Present: normal appearance, soft, normal bowel sounds.  Absent: 

distention, tenderness, guarding


Vulva: both: normal


Uterus: Present: normal, firm, fundal height below umbilicus.  Absent: bogginess

, tenderness


Extremities: Present: normal


Deep Tendon Reflex Grade: Normal +2


Incision: Present: normal, dry, intact, dressed

## 2018-08-18 VITALS — SYSTOLIC BLOOD PRESSURE: 138 MMHG | DIASTOLIC BLOOD PRESSURE: 93 MMHG

## 2018-08-18 RX ADMIN — IBUPROFEN PRN MG: 800 TABLET, FILM COATED ORAL at 10:15

## 2018-08-18 RX ADMIN — FERROUS SULFATE TAB 325 MG (65 MG ELEMENTAL FE) SCH MG: 325 (65 FE) TAB at 10:15

## 2018-08-18 RX ADMIN — OXYCODONE AND ACETAMINOPHEN PRN TAB: 5; 325 TABLET ORAL at 10:17

## 2018-08-18 NOTE — PROGRESS NOTE
Assessment and Plan





- Patient Problems


(1) Status post primary low transverse  section


Current Visit: Yes   Status: Acute   


Plan to address problem: 


Patient doing well


Discharge home








Subjective





- Subjective


Date of service: 18


Principal diagnosis: IUP at 41 wks undergoing induction of labor 


Interval history: 


Patient without any significant complaints.  She is tolerating a regular diet 

and her pain is well-controlled.





Patient reports: appetite normal, voiding normally, pain well controlled


: doing well





Objective





- Vital Signs


Latest vital signs: 


 Vital Signs











  Temp Pulse Resp BP BP Pulse Ox


 


 18 07:27  97.4 F L  76  16  128/77   92


 


 18 00:34  98.8 F  75  20  137/65   96


 


 18 17:14  98.7 F  87  18  108/63   96


 


 18 15:45  97.8 F  65  20   117/63  97








 Intake and Output











 18





 22:59 06:59 14:59


 


Intake Total 840  


 


Balance 840  


 


Intake:   


 


  Oral 600  


 


  Intake, Free Water 240  


 


Other:   


 


  Total, Intake Amount 600  


 


  Voiding Method Toilet  


 


  # Voids 3  














- Exam


Abdomen: Present: normal appearance, soft


Uterus: Present: normal, firm

## 2018-08-18 NOTE — DISCHARGE SUMMARY
Providers





- Providers


Date of Admission: 


18 20:29





Date of discharge: 18


Attending physician: 


ANU MORRISON MD





 





08/15/18 18:07


Consult to Lactation Consultant [CONS] Routine 


   Reason For Exam: 











Primary care physician: 


ANU MORRISON MD








Hospitalization


Reason for admission: induction of labor


Delivery: 


Procedure:  section, primary low transverse


Incision: normal


Discharge diagnosis: IUP at term delivered


Hospital course: 


The patient was admitted for induction of labor for postdates.  Her intrapartum 

course was complicated by nonreassuring fetal heart rate tracing with the 

patient underwent a primary  delivery.  Please see operative note for 

details of surgery.  Her postoperative course was uneventful.





Condition at discharge: Good


Disposition: DC-01 TO HOME OR SELFCARE





- Discharge Diagnoses


(1) Status post primary low transverse  section


Status: Acute   





Plan





- Discharge Medications


Prescriptions: 


Docusate Sodium [Colace] 100 mg PO BID PRN #60 capsule


 PRN Reason: Constipation


Ibuprofen [Motrin] 800 mg PO Q8HR PRN #60 tablet


 PRN Reason: Pain, Mild (1-3)


Oxycodone HCl/Acetaminophen [Percocet 7.5/325 mg] 1 each PO Q6HR PRN #45 tablet


 PRN Reason: Pain





- Provider Discharge Summary


Activity: no sex for 6 weeks, no heavy lifting 4 weeks, no strenuous exercise


Diet: routine


Instructions: routine


Additional instructions: 


[]  Smoking cessation referral if applicable(refer to patient education folder 

for contact #)


[]  Refer to [facility]'s [facility] Booklet








Call your doctor immediately for:


* Fever > 100.5


* Heavy vaginal bleeding ( >1 pad per hour)


* Severe persistent headache


* Shortness of breath


* Reddened, hot, painful area to leg or breast


* Drainage or odor from incision.





* Keep incision clean and dry at all times and follow doctor's instructions 

regarding bathing/showering


Scheduled follow-up at [facility] in 2 weeks








- Follow up plan

## 2021-07-24 ENCOUNTER — HOSPITAL ENCOUNTER (OUTPATIENT)
Dept: HOSPITAL 5 - TRG | Age: 26
Discharge: HOME | End: 2021-07-24
Attending: OBSTETRICS & GYNECOLOGY
Payer: COMMERCIAL

## 2021-07-24 VITALS — DIASTOLIC BLOOD PRESSURE: 60 MMHG | SYSTOLIC BLOOD PRESSURE: 119 MMHG

## 2021-07-24 DIAGNOSIS — O26.893: Primary | ICD-10-CM

## 2021-07-24 DIAGNOSIS — Z3A.30: ICD-10-CM

## 2021-07-24 DIAGNOSIS — R10.30: ICD-10-CM

## 2021-07-24 LAB
BACTERIA #/AREA URNS HPF: (no result) /HPF
BILIRUB UR QL STRIP: (no result)
BLOOD UR QL VISUAL: (no result)
PH UR STRIP: 7 [PH] (ref 5–7)
PROT UR STRIP-MCNC: (no result) MG/DL
RBC #/AREA URNS HPF: 2 /HPF (ref 0–6)
UROBILINOGEN UR-MCNC: < 2 MG/DL (ref ?–2)
WBC #/AREA URNS HPF: < 1 /HPF (ref 0–6)

## 2021-07-24 PROCEDURE — 81001 URINALYSIS AUTO W/SCOPE: CPT

## 2021-07-24 PROCEDURE — 96361 HYDRATE IV INFUSION ADD-ON: CPT

## 2021-07-24 PROCEDURE — 87086 URINE CULTURE/COLONY COUNT: CPT

## 2021-07-24 PROCEDURE — 96360 HYDRATION IV INFUSION INIT: CPT

## 2021-07-24 PROCEDURE — 59025 FETAL NON-STRESS TEST: CPT

## 2021-10-26 ENCOUNTER — HOSPITAL ENCOUNTER (INPATIENT)
Dept: HOSPITAL 5 - APU | Age: 26
LOS: 2 days | Discharge: HOME | End: 2021-10-28
Attending: OBSTETRICS & GYNECOLOGY | Admitting: OBSTETRICS & GYNECOLOGY
Payer: MEDICAID

## 2021-10-26 DIAGNOSIS — Z3A.39: ICD-10-CM

## 2021-10-26 DIAGNOSIS — B00.9: ICD-10-CM

## 2021-10-26 DIAGNOSIS — O34.211: Primary | ICD-10-CM

## 2021-10-26 DIAGNOSIS — O41.03X0: ICD-10-CM

## 2021-10-26 DIAGNOSIS — K21.9: ICD-10-CM

## 2021-10-26 LAB
BASOPHILS # (AUTO): 0.1 K/MM3 (ref 0–0.1)
BASOPHILS NFR BLD AUTO: 0.6 % (ref 0–1.8)
EOSINOPHIL # BLD AUTO: 0 K/MM3 (ref 0–0.4)
EOSINOPHIL NFR BLD AUTO: 0.3 % (ref 0–4.3)
HCT VFR BLD CALC: 39.4 % (ref 30.3–42.9)
HGB BLD-MCNC: 12.9 GM/DL (ref 10.1–14.3)
LYMPHOCYTES # BLD AUTO: 1.7 K/MM3 (ref 1.2–5.4)
LYMPHOCYTES NFR BLD AUTO: 20.3 % (ref 13.4–35)
MCHC RBC AUTO-ENTMCNC: 33 % (ref 30–34)
MCV RBC AUTO: 89 FL (ref 79–97)
MONOCYTES # (AUTO): 0.5 K/MM3 (ref 0–0.8)
MONOCYTES % (AUTO): 6 % (ref 0–7.3)
PLATELET # BLD: 179 K/MM3 (ref 140–440)
RBC # BLD AUTO: 4.44 M/MM3 (ref 3.65–5.03)

## 2021-10-26 PROCEDURE — G0463 HOSPITAL OUTPT CLINIC VISIT: HCPCS

## 2021-10-26 PROCEDURE — 85014 HEMATOCRIT: CPT

## 2021-10-26 PROCEDURE — 85018 HEMOGLOBIN: CPT

## 2021-10-26 PROCEDURE — 86850 RBC ANTIBODY SCREEN: CPT

## 2021-10-26 PROCEDURE — 99211 OFF/OP EST MAY X REQ PHY/QHP: CPT

## 2021-10-26 PROCEDURE — 85025 COMPLETE CBC W/AUTO DIFF WBC: CPT

## 2021-10-26 PROCEDURE — 36415 COLL VENOUS BLD VENIPUNCTURE: CPT

## 2021-10-26 PROCEDURE — 86901 BLOOD TYPING SEROLOGIC RH(D): CPT

## 2021-10-26 PROCEDURE — 86900 BLOOD TYPING SEROLOGIC ABO: CPT

## 2021-10-26 RX ADMIN — SODIUM CHLORIDE, SODIUM LACTATE, POTASSIUM CHLORIDE, AND CALCIUM CHLORIDE SCH MLS/HR: .6; .31; .03; .02 INJECTION, SOLUTION INTRAVENOUS at 15:57

## 2021-10-26 RX ADMIN — KETOROLAC TROMETHAMINE SCH MG: 30 INJECTION, SOLUTION INTRAMUSCULAR at 22:43

## 2021-10-26 RX ADMIN — SODIUM CHLORIDE, SODIUM LACTATE, POTASSIUM CHLORIDE, AND CALCIUM CHLORIDE SCH MLS/HR: .6; .31; .03; .02 INJECTION, SOLUTION INTRAVENOUS at 15:25

## 2021-10-26 NOTE — ANESTHESIA CONSULTATION
Anesthesia Consult and Med Hx


Date of service: 10/26/21





- Airway


Anesthetic Teeth Evaluation: Poor


ROM Head & Neck: Adequate


Mental/Hyoid Distance: Adequate


Mallampati Class: Class II


Intubation Access Assessment: Probably Good





- Pulmonary Exam


CTA: Yes





- Cardiac Exam


Cardiac Exam: RRR





- Pre-Operative Health Status


ASA Pre-Surgery Classification: ASA2


Proposed Anesthetic Plan: Spinal





- Pulmonary


Hx Smoking: No


Hx Asthma: No


Hx Respiratory Symptoms: No


SOB: No


COPD: No


Home Oxygen Therapy: No


Hx Pneumonia: No


Hx Sleep Apnea: No





- Cardiovascular System


Hx Hypertension: No


Hx Coronary Artery Disease: No


Hx Heart Attack/AMI: No


Hx Angina: No


Hx Percutaneous Transluminal Coronary Angioplasty (PTCA): No


Hx Cardia Arrhythmia: No


Hx Pacemaker: No


Hx Internal Defibrillator: No


Hx Valvular Heart Disease: No


Hx Heart Murmur: No


Hx Peripheral Vascular Disease: No





- Central Nervous System


Hx Neuromuscular Disorder: No


Hx Seizures: No


CVA: No


Hx Back Pain: Yes


Hx Psychiatric Problems: No (anxiety)





- Gastrointestinal


Hx Ulcer: No


Hx Gastroesophageal Reflux Disease: Yes





- Endocrine


Hx Renal Disease: No


Hx End Stage Renal Disease: No


Hx Cirrhosis: No


Hx Liver Disease: No


Hx Insulin Dependent Diabetes: No


Hx Non-Insulin Dependent Diabetes: No


Hx Thyroid Disease: No


Hx Hypothyroidism: No


Hx Hyperthyroidism: No





- Hematic


Hx Anemia: No


Hx Sickle Cell Disease: No





- Other Systems


Hx Alcohol Use: No


Hx Substance Use: No


Hx Cancer: No


Hx Obesity: No

## 2021-10-26 NOTE — HISTORY AND PHYSICAL REPORT
History of Present Illness


Date of examination: 10/27/21


Date of admission: 


10/26/21 14:26





Chief complaint: 





sent from the office 


History of present illness: 





Pt is a 26 year old  REINIER 19 a 39w1d who presents from the office 

with finding of oligohydramnios on routine obstetric ultrasound. She denies 

contractions, vaginal bleeding or leakage of fluid. She has had prenatal care at

[facility] since 12 wks complicated by prior  section, 

nausea and vomiting, GERD, and genital herpes without lesion or prodrome. She is

GBS negative. 





Past History


Past Medical History: GERD


Past Surgical History:  section


GYN History: herpes


Family/Genetic History: other (anemia )


Social history: no significant social history





- Obstetrical History


Expected Date of Delivery: 21


Actual Gestation: 39 Week(s) 2 Day(s) 


: 3


Para: 1


Hx # Term Pregnancies: 1


Number of  Pregnancies: 0


Spontaneous Abortions: 1


Induced : 0


Number of Living Children: 1





Medications and Allergies


                                    Allergies











Allergy/AdvReac Type Severity Reaction Status Date / Time


 


No Known Allergies Allergy   Verified 02/10/18 12:52











                                Home Medications











 Medication  Instructions  Recorded  Confirmed  Last Taken  Type


 


oxyCODONE /ACETAMINOPHEN [Percocet 1 tab PO Q6HR PRN #30 tablet 10/26/21  

Unknown Rx





5/325]     











Active Meds: 


Active Medications





Citric Acid/Sodium Citrate (Bicitra Oral Liqd 30ml)  30 ml PO ONCE NR


   Stop: 10/26/21 18:00


   Last Admin: 10/26/21 15:26 Dose:  30 ml


   Documented by: 


Famotidine (Famotidine 20 Mg/2 Ml Inj)  20 mg IV ONCE NR


   Stop: 10/26/21 18:00


   Last Admin: 10/26/21 15:26 Dose:  20 mg


   Documented by: 


Hydromorphone HCl (Hydromorphone 1 Mg/1 Ml Inj)  0.5 mg IV Q5M PRN


   PRN Reason: BREAK


   Stop: 10/26/21 23:00


Hydromorphone HCl (Hydromorphone 1 Mg/1 Ml Inj)  0.5 mg IV Q4H PRN


   PRN Reason: breakthrough pain > 7/10


Lactated Ringer's (Lactated Ringers)  1,000 mls @ 2,250 mls/hr IV PREOP DM


   Stop: 10/27/21 15:27


   Last Admin: 10/26/21 15:57 Dose:  2,250 mls/hr


   Documented by: 


Oxytocin/Sodium Chloride (Pitocin/Ns 30 Unit/500ml)  30 units in 500 mls @ 0 

mls/hr IV TITR DM; Protocol


Cefazolin Sodium (Ancef/Sterile Water 2 Gm/20 Ml)  2 gm in 20 mls @ 80 mls/hr IV

PREOP NR; Protocol


   Stop: 10/26/21 20:00


Methylergonovine Maleate (Methylergonovine Maleate 0.2 Mg/Ml Vial)  0.2 mg IM 

ONCE NR


   Stop: 10/26/21 18:00


Metoclopramide HCl (Metoclopramide 10 Mg/2 Ml Inj)  10 mg IV ONCE NR


   Stop: 10/26/21 18:00


   Last Admin: 10/26/21 15:26 Dose:  10 mg


   Documented by: 


Naloxone HCl (Naloxone 0.4 Mg/1 Ml Inj)  0.2 mg IV Q2MIN PRN


   PRN Reason: Res Rate </= 8 or 02 SAT < 92%


Ondansetron HCl (Ondansetron 4 Mg/2 Ml Inj)  4 mg IV Q8H PRN


   PRN Reason: Nausea And Vomiting











Review of Systems


All systems: negative





- Vital Signs


Vital signs: 


                                   Vital Signs











Pulse BP Pulse Ox


 


 82   130/79   100 


 


 10/26/21 15:12  10/26/21 15:12  10/26/21 15:12








                                        











Temp Pulse Resp BP Pulse Ox


 


    99 H     130/79   100 


 


    10/26/21 16:17     10/26/21 15:12  10/26/21 16:17














- Physical Exam


Breasts: Positive: deferred


Abdomen: Positive: soft (gravid )


Uterus: Positive: enlarged (gravid )


Extremities: Positive: normal





- Obstetrical


FHR: auscultation normal


Uterine Contraction Monitor Mode: External


Cervical Dilatation: 0


Uterine Contraction Pattern: Absent


Uterine Tone Measurement Phase: Resting





Results


Result Diagrams: 


                                 10/26/21 15:10





                              Abnormal lab results











  10/26/21 Range/Units





  15:10 


 


Seg Neutrophils %  72.8 H  (40.0-70.0)  %








All other labs normal.








Assessment and Plan





A: IUP at 39w1d


    Previous  x 1


    Oligohydramnios 


    Nausea and vomiting


    GERD


    Genital herpes without lesion or prodrome


    GBS negative








P: Admit to labor and delivery


    Prepare for repeat  section and other indicated procedures

## 2021-10-26 NOTE — OPERATIVE REPORT
Operative Report


Operative Report: 


Date of procedure: 2021 


Preoperative diagnosis: 1) IUP at 39w1d 2) Previous  x 1 3) Oligoh

ydramnios 


Postoperative diagnosis: Same 


Procedure:  Repeat low transverse  section 


Surgeon: Keke Chandra M.D.


Anesthesia: Spinal 


Findings:


1) Viable male , Apgars  9 and 9, weight 2985 g, (6 lb 9 oz) in cephalic 

presentation. Nuchal cord x 1


2) Normal-appearing uterus ovaries and tubes


Estimated blood loss: 494 mL 


IV fluids: 1400 mL 


Urine output: 200 mL, clear at the end of the procedure 


Drains: Byrd to gravity


Specimens: None


Complications:None. Counts correct x 3


Disposition: Stable to PACU





Indication for procedure:





Pt is a 26 year old  at 39w1d with one prior  section and 

oligohydramnios for repeat  section. 





Operation in detail: 


After the risks, benefits, alternatives and complications were explained to the 

patient she gave informed consent for the procedure.  She was subsequently taken

to the operating room where regional anesthesia was noted to be adequate. She 

was  placed in the dorsal supine position with leftward tilt and prepped and 

draped in a normal sterile fashion.  Fetal heart tones were noted prior to 

incision.  A timeout was performed.





A Pfannenstiel skin incision was made with the knife and carried down to the 

layer of the fascia with the Bovie.  The fascia was incised in the midline and 

the fascial incision was extended bilaterally with the Bovie. The fascial 

incision was then stretched.  The rectus muscles were then  in the 

midline and partially transected for adequate visualization. The peritoneum was 

then entered bluntly. The peritoneal incision was extended with good 

visualization of the bladder.  The peritoneal incision was then stretched. An 

Greg retractor was placed. The bladder blade was then placed.


 


The vesicouterine peritoneum was grasped with smooth pick ups and incised with 

Metzenbaum scissors . A bladder flap was then created digitally and the bladder 

blade was replaced. A transverse incision was made in the lower uterine segment 

with a knife and extended bilaterally with the bandage scissors.  Amniotomy was 

performed with egress of clear fluid. Fetal head delivered with ease, nuchal 

cord x 1 reduced, followed by shoulders and body.   bulb suctioned at 

delivery. Cord clamped and cut.  handed to NICU staff in attendance. Cord

blood was collected. The placenta was then delivered manually.  





The uterus was then exteriorized and cleared of all clots and debris.  The 

hysterotomy was then reapproximated with 0 Moncryl in a running locked fashion. 

A second layer of the same suture was used in imbricating fashion.   The 

hysterotomy was inspected and hemostasis was noted.   The gutters were irrigated

and cleared of all clots and debris. The uterus was placed back into the 

peritoneal cavity.  The hysterotomy was again inspected and noted to be 

hemostatic. Surgicel was placed over the hysterotomy.  The Greg retractor was 

removed. The peritoneum was reapproximated with 0 Monocryl in a running fashion 

incorporating the rectus muscles. Surgicel was placed over the rectus muscles. 

The fascia was reapproximated with 0 Vicryl in a running fashion. The skin was 

reapproximated with 3-0 Monocryl in a subcuticular fashion. The incision was 

then covered with steri strips and a pressure dressing. The procedure was then 

ended.  The patient tolerated the procedure well and was taken to the PACU in 

stable condition.  All instrument, lap, and needle counts were correct 3.

## 2021-10-26 NOTE — PROGRESS NOTE
Spinal Anesthesia Block





- Spinal Anesthesia Block


Start Time: 16:25


Stop Time: 16:31


Performed by:: ELIZABETH MONROE


Procedure: 





Patient IDed, H&P reviewed, all questions and concerns were answered, and 

consent was signed. Timeout was performed at bedside.  Patient in sitting 

position.  Sterile prep and drape was performed.  [3] ml of 1% lidocaine skin 

wheal at L[3]- L [4].  Needle introducer advanced. 25 gauge spinal needle 

advanced.  Clear, free flowing CSF. negative blood, negative paresthesia.  

Spinal dose given. All needles removed.  Patient tolerated procedure.

## 2021-10-26 NOTE — ANESTHESIA DAY OF SURGERY
Anesthesia Day of Surgery





- Day of Surgery


Patient Examined: Yes


Patient H&P Reviewed: Yes


Patient is NPO: Yes


Beta Blockers: No


Cardiac Clearance: No


Pulmonary Clearance: No


Sreekanth's Test: Negative

## 2021-10-26 NOTE — PROCEDURE NOTE
OB Delivery Note





- Delivery


Date of Delivery: 10/26/21


Surgeon: LAUREN LAN


Estimated blood loss: other (494 mL)





-  Section


Preop diagnosis: repeat , other (Oligohydramnios )


Postop diagnosis: same


 section procedure:  section, repeat low transverse


Disposition: PACU


Narrative: 





Please see operative report 





- Infant


  ** A


Apgar at 1 minute: 9


Apgar at 5 minutes: 9


Infant Gender: Male (2985g (6lb 9oz) @ 1701 pm)

## 2021-10-26 NOTE — PROGRESS NOTE
Regional Anesthesia Block





- Regional Anesthesia Block


Start Time: 17:50


Stop Time: 17:52


Performed By:: ELIZABETH MONROE


Procedure: 





Patient consented for TAP block for post surgical pain management.  Patient 

identified, monitors placed, and time out performed.  TAP identified bilaterally

via ultrasound.  Skin prepped bilaterally with [chlorhexidine] and [22g 

stimuplex] needle advanced to the TAP.  [Marcaine 0.25% 35ml] injected under 

ultrasound guidance on the [left] side.  [Marcaine 0.25% 35ml] injected under 

ultrasound guidance on the [right] side. Negative aspiration every 5mL, No 

change in heart rate or rhythm. Patient tolerated the procedure well. No 

apparent complications seen.

## 2021-10-27 LAB
HCT VFR BLD CALC: 36.2 % (ref 30.3–42.9)
HGB BLD-MCNC: 12.2 GM/DL (ref 10.1–14.3)

## 2021-10-27 RX ADMIN — IBUPROFEN PRN MG: 800 TABLET, FILM COATED ORAL at 20:04

## 2021-10-27 RX ADMIN — CEFAZOLIN SCH MLS/HR: 330 INJECTION, POWDER, FOR SOLUTION INTRAMUSCULAR; INTRAVENOUS at 03:26

## 2021-10-27 RX ADMIN — KETOROLAC TROMETHAMINE SCH MG: 30 INJECTION, SOLUTION INTRAMUSCULAR at 10:02

## 2021-10-27 RX ADMIN — OXYCODONE AND ACETAMINOPHEN PRN TAB: 5; 325 TABLET ORAL at 15:07

## 2021-10-27 RX ADMIN — CEFAZOLIN SCH MLS/HR: 330 INJECTION, POWDER, FOR SOLUTION INTRAMUSCULAR; INTRAVENOUS at 10:53

## 2021-10-27 RX ADMIN — KETOROLAC TROMETHAMINE SCH MG: 30 INJECTION, SOLUTION INTRAMUSCULAR at 03:25

## 2021-10-27 RX ADMIN — OXYCODONE AND ACETAMINOPHEN PRN TAB: 5; 325 TABLET ORAL at 22:27

## 2021-10-27 NOTE — PROGRESS NOTE
Assessment and Plan


A: POD#1 s/p R. LTCS at term





P:Continue with routine postpartum care w. discharge anticipated at 48hrs








Subjective





- Subjective


Date of service: 10/27/21


Principal diagnosis: POD#1 s/p R. C/S at term


Interval history: 


Patient is feeling well. Reporting pain is well controlled, decreasing lochia, 

+flatus and has been ambulating to restroom unassisted.





Patient reports: voiding normally, pain well controlled, flatus, bowel movement,

ambulating normally


[place]: doing well





Objective





- Vital Signs


Latest vital signs: 


                                   Vital Signs











  Temp Pulse Resp BP BP Pulse Ox Pulse Ox


 


 10/27/21 08:47        100


 


 10/27/21 08:00  98.1 F  74  18   115/72  


 


 10/27/21 04:30  98.6 F  73  16  112/55   97 


 


 10/27/21 01:10        100


 


 10/27/21 00:41    18     100


 


 10/27/21 00:14  98.2 F  63  16  128/70   97 


 


 10/26/21 22:43        100


 


 10/26/21 20:15  99.0 F  56 L  18   133/74  100 


 


 10/26/21 20:00  97.7 F  56 L  17   131/79  


 


 10/26/21 19:10   60  12   129/73  100 


 


 10/26/21 18:50   63  13  131/71   100 


 


 10/26/21 18:20   57 L  13  135/76   100 


 


 10/26/21 18:15   61  10 L  131/68   100 


 


 10/26/21 18:10   61  12  131/74   100 


 


 10/26/21 18:05   55 L  11 L  128/72   100 


 


 10/26/21 18:01  97.7 F  60  13  126/74   100 


 


 10/26/21 16:17   99 H     100 


 


 10/26/21 16:12   90     100 


 


 10/26/21 16:07   81     100 


 


 10/26/21 16:02   79     100 


 


 10/26/21 15:57   85     100 


 


 10/26/21 15:52   83     100 


 


 10/26/21 15:47   90     98 


 


 10/26/21 15:43   92 H     89 


 


 10/26/21 15:42   91 H     99 


 


 10/26/21 15:37   81     100 


 


 10/26/21 15:32   79     99 


 


 10/26/21 15:27   121 H     99 


 


 10/26/21 15:22   77     100 


 


 10/26/21 15:17   75     98 


 


 10/26/21 15:12   82   130/79   100 








                                Intake and Output











 10/26/21 10/27/21 10/27/21





 23:59 07:59 15:59


 


Intake Total 1650 530 320


 


Output Total 1365 3200 500


 


Balance 285 -2670 -180


 


Intake:   


 


  IV 1550 50 


 


    ANCEF/NS 1 GM/50 ML 1 gm  50 





    In 50 ml @ 100 mls/hr IV   





    Q8H [facility] Rx#:[id number]   


 


  Oral 100 480 320


 


Output:   


 


  Urine 1365 3200 500


 


    Indwelling Catheter 200 1600 


 


    Uretheral (Byrd) 690 1600 


 


    Void   500


 


Other:   


 


  Total, Intake Amount 100 240 320


 


  Total, Output Amount 200 1100 500














- Labs


Labs: 


                              Abnormal lab results











  10/26/21 Range/Units





  15:10 


 


Seg Neutrophils %  72.8 H  (40.0-70.0)  %

## 2021-10-28 VITALS — DIASTOLIC BLOOD PRESSURE: 74 MMHG | SYSTOLIC BLOOD PRESSURE: 120 MMHG

## 2021-10-28 RX ADMIN — IBUPROFEN PRN MG: 800 TABLET, FILM COATED ORAL at 05:32

## 2021-10-28 RX ADMIN — OXYCODONE AND ACETAMINOPHEN PRN TAB: 5; 325 TABLET ORAL at 03:40

## 2021-10-28 NOTE — PROGRESS NOTE
Assessment and Plan


A: POD#2 s/p R. LTCS at term





P: Pt to be discharged home w. f/u in office in 2 weeks. 








Subjective





- Subjective


Principal diagnosis: POD#2 s/p R. C/S at term


Interval history: 


POD#2 s/p R. LTCS. Patient is feeling well and is without complaints. Patient 

reports she is ready to go home





Patient reports: appetite normal, voiding normally, dizzy ambulation, pain well 

controlled, flatus, ambulating normally, no bowel movement


: doing well





Objective





- Vital Signs


Latest vital signs: 


                                   Vital Signs











  Temp Pulse Resp BP Pulse Ox Pulse Ox


 


 10/28/21 09:54       100


 


 10/28/21 08:45  98.1 F  78  16  120/74  100 


 


 10/28/21 03:40    18   


 


 10/28/21 00:00  98.1 F  76  18  109/61  99 


 


 10/27/21 22:56       97


 


 10/27/21 22:27    18   


 


 10/27/21 20:04    18   








                                Intake and Output











 10/27/21 10/28/21 10/28/21





 23:59 07:59 15:59


 


Intake Total  136 


 


Output Total 700  


 


Balance -700 136 


 


Intake:   


 


  Oral  136 


 


Output:   


 


  Urine 700  


 


    Void 700  


 


Other:   


 


  Total, Intake Amount  136 


 


  Total, Output Amount 700  


 


  # Voids   


 


    Void 1

## 2021-10-28 NOTE — DISCHARGE SUMMARY
Providers





- Providers


Date of Admission: 


10/26/21 14:26





Date of discharge: 10/28/21


Attending physician: 


LAUREN LAN





                                        





10/26/21 20:15


Consult to Lactation Consultant [CONS] Routine 


   Reason For Exam: 











Primary care physician: 


LUAREN LAN








Hospitalization


Reason for admission:  section, IUP at term (Oligohydramnios)


Delivery: 


Procedure: repeat low transverse


Incision: normal, dry


Other postpartum procedures: none


Postpartum complications: none


Discharge diagnosis: IUP at term delivered


[place] baby: male


Hospital course: 


Patient presented from the office with finding of oligohydramnios on routine 

obstetric ultrasound, she went on to have a R. LTCS of viable  infant. 

Her postpartum course was uncomplicated. 





Condition at discharge: Good


Disposition: 01 HOME / SELF CARE / HOMELESS





Plan





- Discharge Medications


Prescriptions: 


Docusate Sodium [Colace] 100 mg PO BID PRN #30 capsule


 PRN Reason: Constipation


Ibuprofen [Motrin 600 MG tab] 600 mg PO Q8H PRN #30 tablet


 PRN Reason: Pain


oxyCODONE /ACETAMINOPHEN [Percocet 5/325] 1 tab PO Q6HR PRN #30 tablet


 PRN Reason: Pain





- Provider Discharge Summary


Activity: routine, no sex for 6 weeks, no heavy lifting 4 weeks, no strenuous 

exercise


Diet: routine


Instructions: routine


Additional instructions: 


[]  Smoking cessation referral if applicable(refer to patient education folder 

for contact #)


[]  Refer to [facility]'s [facility] Booklet








Call your doctor immediately for:


* Fever > 100.5


* Heavy vaginal bleeding ( >1 pad per hour)


* Severe persistent headache


* Shortness of breath


* Reddened, hot, painful area to leg or breast


* Drainage or odor from incision.





* Keep incision clean and dry at all times and follow doctor's instructions 

regarding bathing/showering





Return to [facility] in 2 weeks for incision check





- Follow up plan


Follow up: 


LAUREN LAN MD [Primary Care Provider] - 14 Days

## 2021-12-04 ENCOUNTER — HOSPITAL ENCOUNTER (EMERGENCY)
Dept: HOSPITAL 5 - ED | Age: 26
Discharge: HOME | End: 2021-12-04
Payer: MEDICAID

## 2021-12-04 VITALS — DIASTOLIC BLOOD PRESSURE: 69 MMHG | SYSTOLIC BLOOD PRESSURE: 111 MMHG

## 2021-12-04 DIAGNOSIS — N92.1: Primary | ICD-10-CM

## 2021-12-04 DIAGNOSIS — Z87.59: ICD-10-CM

## 2021-12-04 DIAGNOSIS — R10.30: ICD-10-CM

## 2021-12-04 LAB
ALBUMIN SERPL-MCNC: 4.1 G/DL (ref 3.9–5)
ALT SERPL-CCNC: 12 UNITS/L (ref 7–56)
BASOPHILS # (AUTO): 0 K/MM3 (ref 0–0.1)
BASOPHILS NFR BLD AUTO: 0.7 % (ref 0–1.8)
BILIRUB UR QL STRIP: (no result)
BLOOD UR QL VISUAL: (no result)
BUN SERPL-MCNC: 9 MG/DL (ref 7–17)
BUN/CREAT SERPL: 15 %
CALCIUM SERPL-MCNC: 9.1 MG/DL (ref 8.4–10.2)
EOSINOPHIL # BLD AUTO: 0.1 K/MM3 (ref 0–0.4)
EOSINOPHIL NFR BLD AUTO: 2.1 % (ref 0–4.3)
HCT VFR BLD CALC: 37.6 % (ref 30.3–42.9)
HEMOLYSIS INDEX: 5
HGB BLD-MCNC: 12 GM/DL (ref 10.1–14.3)
LYMPHOCYTES # BLD AUTO: 1.5 K/MM3 (ref 1.2–5.4)
LYMPHOCYTES NFR BLD AUTO: 26.8 % (ref 13.4–35)
MCHC RBC AUTO-ENTMCNC: 32 % (ref 30–34)
MCV RBC AUTO: 89 FL (ref 79–97)
MONOCYTES # (AUTO): 0.4 K/MM3 (ref 0–0.8)
MONOCYTES % (AUTO): 6.6 % (ref 0–7.3)
PH UR STRIP: 6 [PH] (ref 5–7)
PLATELET # BLD: 172 K/MM3 (ref 140–440)
PROT UR STRIP-MCNC: (no result) MG/DL
RBC # BLD AUTO: 4.22 M/MM3 (ref 3.65–5.03)
RBC #/AREA URNS HPF: > 182 /HPF (ref 0–6)
UROBILINOGEN UR-MCNC: < 2 MG/DL (ref ?–2)
WBC #/AREA URNS HPF: 21 /HPF (ref 0–6)

## 2021-12-04 PROCEDURE — 76856 US EXAM PELVIC COMPLETE: CPT

## 2021-12-04 PROCEDURE — 36415 COLL VENOUS BLD VENIPUNCTURE: CPT

## 2021-12-04 PROCEDURE — 81001 URINALYSIS AUTO W/SCOPE: CPT

## 2021-12-04 PROCEDURE — 84702 CHORIONIC GONADOTROPIN TEST: CPT

## 2021-12-04 PROCEDURE — 80053 COMPREHEN METABOLIC PANEL: CPT

## 2021-12-04 PROCEDURE — 76830 TRANSVAGINAL US NON-OB: CPT

## 2021-12-04 PROCEDURE — 99284 EMERGENCY DEPT VISIT MOD MDM: CPT

## 2021-12-04 PROCEDURE — 87086 URINE CULTURE/COLONY COUNT: CPT

## 2021-12-04 PROCEDURE — 85025 COMPLETE CBC W/AUTO DIFF WBC: CPT

## 2021-12-04 NOTE — EMERGENCY DEPARTMENT REPORT
<YANELIS DAVIES - Last Filed: 21 21:22>





ED Female  HPI





- General


Chief complaint: Vaginal Bleeding


Stated complaint: HEAVY BLEEDING, 5 WEEKS POSTPARTUM 


Time Seen by Provider: 21 18:33


Source: patient


Mode of arrival: Ambulatory


Limitations: No Limitations





- History of Present Illness


Initial comments: 





Patient is a 26-year-old female presents emergency room complaints of heavy 

vaginal bleeding that began 2 days ago.  She has associated suprapubic abdominal

pain.  Patient had a  performed 5 weeks ago.  She states that her 

OB/[facility].  She states that she did have a 2-week 

follow-up after  reports everything was normal at that time.  She 

states that she delivered via  at 39 weeks with no complications.  She 

states that this is her first time bleeding after her procedure.  She denies any

fever, nausea, vomiting, diarrhea, dysuria, vaginal discharge.  No past medical 

history.  No allergies to medications.  She is not breast-feeding.





- Related Data


                                  Previous Rx's











 Medication  Instructions  Recorded  Last Taken  Type


 


Naproxen 375 mg PO BID PRN #14 tablet 21 Unknown Rx


 


medroxyPROGESTERone ACETATE 10 mg PO QDAY 10 Days #10 tablet 21 Unknown Rx





[Provera]    











                                    Allergies











Allergy/AdvReac Type Severity Reaction Status Date / Time


 


No Known Allergies Allergy   Verified 10/27/21 07:14














ED Review of Systems


Comment: All other systems reviewed and negative





ED Past Medical Hx





- Past Medical History


Hx Hypertension: No


Hx Heart Attack/AMI: No


Hx Congestive Heart Failure: No


Hx Diabetes: No


Hx Deep Vein Thrombosis: No


Hx Liver Disease: No


Hx Renal Disease: No


Hx Sickle Cell Disease: No


Hx Seizures: No


Hx Asthma: No


Hx COPD: No


Hx HIV: No





- Surgical History


Hx Pacemaker: No


Hx Internal Defibrillator: No





- Social History


Smoking Status: Never Smoker


Substance Use Type: None





- Medications


Home Medications: 


                                Home Medications











 Medication  Instructions  Recorded  Confirmed  Last Taken  Type


 


Naproxen 375 mg PO BID PRN #14 tablet 21  Unknown Rx


 


medroxyPROGESTERone ACETATE 10 mg PO QDAY 10 Days #10 tablet 21  Unknown 

Rx





[Provera]     














ED Physical Exam





- General


Limitations: No Limitations


General appearance: alert, in no apparent distress





- Head


Head exam: Present: atraumatic, normocephalic





- Eye


Eye exam: Present: normal appearance





- ENT


ENT exam: Present: mucous membranes moist





- Respiratory


Respiratory exam: Present: normal lung sounds bilaterally.  Absent: respiratory 

distress, wheezes, rales, rhonchi, stridor, chest wall tenderness, accessory 

muscle use, decreased breath sounds, prolonged expiratory





- Cardiovascular


Cardiovascular Exam: Present: regular rate, normal rhythm, normal heart sounds. 

 Absent: systolic murmur, diastolic murmur, rubs, gallop





- GI/Abdominal


GI/Abdominal exam: Present: soft, tenderness (suprapubic), normal bowel sounds, 

other (horizontal suprapubic incision from  appears clean,dry intact 

without signs of infection or wound dehiscence).  Absent: distended, guarding, 

rebound, rigid





- Neurological Exam


Neurological exam: Present: alert, oriented X3





- Psychiatric


Psychiatric exam: Present: normal affect, normal mood





- Skin


Skin exam: Present: warm, dry, intact





ED Medical Decision Making





- Lab Data


Result diagrams: 


                                 21 18:37





                                 21 18:37





- Medical Decision Making





Patient is a 26-year-old female presents emergency room complaints of heavy 

vaginal bleeding that began 2 days ago.  She has associated suprapubic abdominal

 pain.  Patient had a  performed 5 weeks ago.  She states that her 

OB/GYN is Dr[facility] with [facility].  She states that she did have a 2-week 

follow-up after  reports everything was normal at that time.  She 

states that she delivered via  at 39 weeks with no complications.  She 

states that this is her first time bleeding after her procedure.  She denies any

 fever, nausea, vomiting, diarrhea, dysuria, vaginal discharge.  No past medical

 history.  No allergies to medications.  She is not breast-feeding.  Vitals are 

normal.  Patient has suprapubic abdominal tenderness on exam, no guarding, no 

rebound, no rigidity, no peritoneal signs,horizontal suprapubic incision from c-

section appears clean,dry intact without signs of infection or wound dehiscence.

  Labs are stable.  H&H is normal.  hCG quant is less than 2.  UA shows many red

 blood cells, there are some white blood cells and trace leukocyte esterase, do 

not suspect UTI, patient is not having urinary symptoms, I believe this is 

likely secondary due to contamination








Pelvic ultrasound ordered and is pending, Dr. RAMONA Stout, ER attending will 

follow up on ultrasound results














ED Disposition


Clinical Impression: 


 Status post primary low transverse  section





Menorrhagia


Qualifiers:


 Menorrhagia type: with irregular cycle Qualified Code(s): N92.1 - Excessive and

 frequent menstruation with irregular cycle





Abdominal pain


Qualifiers:


 Abdominal location: lower abdomen, unspecified Qualified Code(s): R10.30 - 

Lower abdominal pain, unspecified





Disposition: 01 HOME / SELF CARE / HOMELESS


Is pt being admited?: No


Does the pt Need Aspirin: No


Condition: Stable


Instructions:  Menorrhagia, Easy-to-Read


Additional Instructions: 


Please take medication as prescribed.  Increase your fluid intake.  Practice 

pelvic rest.  Follow-up with your OB/GYN.  Return to emergency room for any new 

or worsening symptoms.


Prescriptions: 


Naproxen 375 mg PO BID PRN #14 tablet


 PRN Reason: pain


medroxyPROGESTERone ACETATE [Provera] 10 mg PO QDAY 10 Days #10 tablet


Referrals: 


[facility] [Primary Care Provider] - 3-5 Days


[facility] [Provider Group] - 3-5 Days


Print Language: ENGLISH





<FREDDY STOUT - Last Filed: 21 22:00>





ED Review of Systems


ROS: 


Stated complaint: HEAVY BLEEDING, 5 WEEKS POSTPARTUM 


Other details as noted in HPI








ED Course





                                   Vital Signs











  21





  18:19 19:02


 


Temperature 98.9 F 96.3 F L


 


Pulse Rate 99 H 80


 


Respiratory 20 16





Rate  


 


Blood Pressure  121/96


 


Blood Pressure 114/58 





[Right]  


 


O2 Sat by Pulse 99 100





Oximetry  














ED Medical Decision Making





- Lab Data


Result diagrams: 


                                 21 18:37





                                 21 18:37





- Medical Decision Making


I evaluated patient,





Ultrasound: No significant abnormality no significant ovarian cyst or mass.  

Doppler flow to both ovaries.  Stable vital signs.  hCG is negative.  No 

evidence of retained products.  H&H within normal limits.  Patient given 

reassurance.  Recommended follow-up with obstetrician as needed.  Patient given 

educated of supportive care instructions.  


Critical care attestation.: 


If time is entered above; I have spent that time in minutes in the direct care o

f this critically ill patient, excluding procedure time.








ED Disposition


Is pt being admited?: No


Does the pt Need Aspirin: No

## 2021-12-04 NOTE — ULTRASOUND REPORT
ULTRASOUND PELVIS  



INDICATION / CLINICAL INFORMATION: post partum heavy bleeding.



TECHNIQUE: Transabdominal and Transvaginal.

Duplex Color Doppler used: Yes. 



COMPARISON: None available



FINDINGS:

UTERUS: Uterus measures 8.4 x 4.7 x 6.8 cm and is homogeneous. Endometrial echo complex measures 0.5 
cm.  



RIGHT ADNEXA: No significant ovarian cyst or mass. Normal color Doppler blood flow. Normal measuring 
1.9 x 1.6 x 2.1 cm.  



LEFT ADNEXA: No significant ovarian cyst or mass. Normal color Doppler blood flow. Normal measuring 2
.5 x 1.6 x 2.2 cm.  



URINARY BLADDER: No significant abnormality. 

FREE FLUID: Minimal free fluid is likely physiologic.

ADDITIONAL FINDINGS: None.



IMPRESSION:

1. No significant abnormality.



Signer Name: Uziel Magaña MD 

Signed: 12/4/2021 9:35 PM

Workstation Name: [id number]

## 2021-12-04 NOTE — ULTRASOUND REPORT
ULTRASOUND PELVIS  



INDICATION / CLINICAL INFORMATION: post partum heavy bleeding.



TECHNIQUE: Transabdominal and Transvaginal.

Duplex Color Doppler used: Yes. 



COMPARISON: None available



FINDINGS:

UTERUS: Uterus measures 8.4 x 4.7 x 6.8 cm and is homogeneous. Endometrial echo complex measures 0.5 
cm.  



RIGHT ADNEXA: No significant ovarian cyst or mass. Normal color Doppler blood flow. Normal measuring 
1.9 x 1.6 x 2.1 cm.  



LEFT ADNEXA: No significant ovarian cyst or mass. Normal color Doppler blood flow. Normal measuring 2
.5 x 1.6 x 2.2 cm.  



URINARY BLADDER: No significant abnormality. 

FREE FLUID: Minimal free fluid is likely physiologic.

ADDITIONAL FINDINGS: None.



IMPRESSION:

1. No significant abnormality.



Signer Name: Uziel Magaña MD 

Signed: 12/4/2021 9:35 PM

Workstation Name: [id number]